# Patient Record
Sex: FEMALE
[De-identification: names, ages, dates, MRNs, and addresses within clinical notes are randomized per-mention and may not be internally consistent; named-entity substitution may affect disease eponyms.]

---

## 2020-12-19 ENCOUNTER — HOSPITAL ENCOUNTER (INPATIENT)
Dept: HOSPITAL 82 - ED | Age: 65
LOS: 1 days | Discharge: HOME | DRG: 293 | End: 2020-12-20
Attending: INTERNAL MEDICINE | Admitting: INTERNAL MEDICINE
Payer: MEDICARE

## 2020-12-19 VITALS — DIASTOLIC BLOOD PRESSURE: 74 MMHG | SYSTOLIC BLOOD PRESSURE: 103 MMHG

## 2020-12-19 VITALS — DIASTOLIC BLOOD PRESSURE: 78 MMHG | SYSTOLIC BLOOD PRESSURE: 138 MMHG

## 2020-12-19 VITALS — SYSTOLIC BLOOD PRESSURE: 122 MMHG | DIASTOLIC BLOOD PRESSURE: 59 MMHG

## 2020-12-19 VITALS — HEIGHT: 60 IN | BODY MASS INDEX: 31.22 KG/M2 | WEIGHT: 159 LBS

## 2020-12-19 VITALS — SYSTOLIC BLOOD PRESSURE: 146 MMHG | DIASTOLIC BLOOD PRESSURE: 73 MMHG

## 2020-12-19 VITALS — SYSTOLIC BLOOD PRESSURE: 143 MMHG | DIASTOLIC BLOOD PRESSURE: 63 MMHG

## 2020-12-19 DIAGNOSIS — E11.9: ICD-10-CM

## 2020-12-19 DIAGNOSIS — I11.0: Primary | ICD-10-CM

## 2020-12-19 DIAGNOSIS — I50.9: ICD-10-CM

## 2020-12-19 DIAGNOSIS — R09.02: ICD-10-CM

## 2020-12-19 DIAGNOSIS — Z79.899: ICD-10-CM

## 2020-12-19 DIAGNOSIS — E78.5: ICD-10-CM

## 2020-12-19 DIAGNOSIS — Z79.84: ICD-10-CM

## 2020-12-19 DIAGNOSIS — F17.210: ICD-10-CM

## 2020-12-19 DIAGNOSIS — E83.42: ICD-10-CM

## 2020-12-19 DIAGNOSIS — Z20.828: ICD-10-CM

## 2020-12-19 LAB
ALBUMIN SERPL-MCNC: 3.8 G/DL (ref 3.2–5)
ALP SERPL-CCNC: 108 U/L (ref 38–126)
ANION GAP SERPL CALCULATED.3IONS-SCNC: 10 MMOL/L
AST SERPL-CCNC: 49 U/L (ref 9–36)
BASOPHILS NFR BLD AUTO: 1 % (ref 0–3)
BUN SERPL-MCNC: 18 MG/DL (ref 8–23)
BUN/CREAT SERPL: 29
CHLORIDE SERPL-SCNC: 105 MMOL/L (ref 95–108)
CO2 SERPL-SCNC: 25 MMOL/L (ref 22–30)
CREAT SERPL-MCNC: 0.6 MG/DL (ref 0.5–1)
D DIMER PPP FEU-MCNC: 1.37 MG/L (ref 0.19–0.6)
EOSINOPHIL NFR BLD AUTO: 3 % (ref 0–8)
ERYTHROCYTE [DISTWIDTH] IN BLOOD BY AUTOMATED COUNT: 12.7 % (ref 11.5–15.5)
HCT VFR BLD AUTO: 36.1 % (ref 37–47)
HGB BLD-MCNC: 11.6 G/DL (ref 12–16)
IMM GRANULOCYTES NFR BLD: 0.4 % (ref 0–5)
INR PPP: 1 RATIO (ref 0.7–1.3)
LYMPHOCYTES NFR BLD: 37 % (ref 15–41)
MCH RBC QN AUTO: 30 PG  CALC (ref 26–32)
MCHC RBC AUTO-ENTMCNC: 32.1 G/DL CAL (ref 32–36)
MCV RBC AUTO: 93.3 FL  CALC (ref 80–100)
MONOCYTES NFR BLD AUTO: 4 % (ref 2–13)
MYOGLOBIN SERPL-MCNC: 29 NG/ML (ref 0–62)
NEUTROPHILS # BLD AUTO: 5.78 THOU/UL (ref 2–7.15)
NEUTROPHILS NFR BLD AUTO: 55 % (ref 42–76)
PLATELET # BLD AUTO: 191 THOU/UL (ref 130–400)
POTASSIUM SERPL-SCNC: 3.5 MMOL/L (ref 3.5–5.1)
PROT SERPL-MCNC: 6.5 G/DL (ref 6.3–8.2)
PROTHROMBIN TIME: 9.8 SECONDS (ref 9–12.5)
RBC # BLD AUTO: 3.87 MILL/UL (ref 4.2–5.6)
SODIUM SERPL-SCNC: 137 MMOL/L (ref 137–146)

## 2020-12-19 NOTE — NUR
REPORT RECEIVED FROM ANIL PADILLA RN. INTRODUCED SELF TO PT REPORTS COUGH AND SOB X
1 WEEK AND WORSENED TODAY. PT ON 1.5 L OF O2 VIA NC. RR 18-20. NSR WITH FREQ
[facility] ON TELE MONITOR. PT AOX3. PERFORMS ADLS PER SELF. PT REQUESTING
MEDICATION FOR COUGH, DR NAPIER NOTIFIED AND ORDER RECEIVED.

## 2020-12-19 NOTE — NUR
PT TRANSPORTED TO [facility]IA STRETCHER WITH MONITOR IN PLACE IN STABLE CONDITION.
PTS NURSE MADE AWARE OF PTS ARRIVAL TO FLOOR.

## 2020-12-19 NOTE — NUR
INFORMED MD ON CALL OF CRITICAL GLUCOSE, ORDERS ENTERED FOR HIGH DOSE SLIDING
SCALE INSULIN. CALL LIGHT IN REACH,CONTINUE TO MONITOR.

## 2020-12-19 NOTE — NUR
PATIETN RESTING QUIETLY, NO C/O PAIN OR DISCOMFORT, SLIGHTLY LABORED BREATHING,
AWAITING RESPIRATORY FOR ABG AND TREATMENT, FAMILY AT BEDSIDE.

## 2020-12-19 NOTE — NUR
PT RESTING IN BED, VOICES NO NEEDS OR COMPLAINTS AT THIS TIME, CALL LIGHT IN
REACH,CONTINUE TO MONITOR.

## 2020-12-19 NOTE — NUR
LASIX GIVEN, BEDSIDE COMMODE PLACED BESIDE THE BED, PATIENT EDUCATED TO USE
CALL LIGHT IF SHE NEEDS TO GET OUT OF BED, FAMILY AT BEDSIDE. AWAKE AND ALERT,
NO C/O PAIN OR DISCOMFORT, NO S/S OF DISTRESS NOTED, RESPIRATIONS EVEN AND
UNLABORED.

## 2020-12-19 NOTE — NUR
PT ARRIVED TO [facility] VIA WHEELCHAIR ACCOMPANIED BY ER NURSE. PT ALERT AND
ORIENTED X3, AMBULATED WITH STEADY GAIT TO BED, ORIENTED PT TO ROOM AND CALL
LIGHT, DISCUSSED POC, PT DENIES ANY NEEDS AT THIS TIME, DISCUSSED NITRO PASTE,
ADMISSION ASSESSMENT COMPLETED, TEDS APPLIED, CALL LIGHT IN REACH,CONTINUE TO
MONITOR.

## 2020-12-19 NOTE — NUR
PT IN BED WITH EYES OPEN AND ABLE TO MAKE NEEDS KNOWN. SKIN WARM TOTUCH.  MEDS
GIVEN AND TOLEERATED WELL. DENIES RESPIRATORY DISTRESS. OXYGEN IN PLACE AT
2LNC AND O2 SAT 99%.  CONTINENT OF B/B AND ABLE TO TRANSER SELF TO TOILET.
CALL LIGHT WITHIN REACH.  WILL CONTINUE TO OBSERVE.  ACCUCHECK 371 AND SSC
GIVEN AS ORDERED.  SNACK ALSO AT BEDSIDE

## 2020-12-19 NOTE — NUR
PT BRADYCARDIC AT 43 BPM WITH BP WITHIN NORMAL PARAMETERS 121/68. NITRO PATCH
THAT WAS IN LA CHEST REMOVED.

## 2020-12-20 VITALS — SYSTOLIC BLOOD PRESSURE: 116 MMHG | DIASTOLIC BLOOD PRESSURE: 71 MMHG

## 2020-12-20 VITALS — DIASTOLIC BLOOD PRESSURE: 41 MMHG | SYSTOLIC BLOOD PRESSURE: 128 MMHG

## 2020-12-20 VITALS — DIASTOLIC BLOOD PRESSURE: 62 MMHG | SYSTOLIC BLOOD PRESSURE: 104 MMHG

## 2020-12-20 VITALS — DIASTOLIC BLOOD PRESSURE: 61 MMHG | SYSTOLIC BLOOD PRESSURE: 115 MMHG

## 2020-12-20 LAB
ANION GAP SERPL CALCULATED.3IONS-SCNC: 11 MMOL/L
BUN SERPL-MCNC: 33 MG/DL (ref 8–23)
BUN/CREAT SERPL: 41
CHLORIDE SERPL-SCNC: 104 MMOL/L (ref 95–108)
CO2 SERPL-SCNC: 26 MMOL/L (ref 22–30)
CREAT SERPL-MCNC: 0.8 MG/DL (ref 0.5–1)
ERYTHROCYTE [DISTWIDTH] IN BLOOD BY AUTOMATED COUNT: 12.7 % (ref 11.5–15.5)
HCT VFR BLD AUTO: 35.9 % (ref 37–47)
HGB BLD-MCNC: 11.7 G/DL (ref 12–16)
MCH RBC QN AUTO: 30.1 PG  CALC (ref 26–32)
MCHC RBC AUTO-ENTMCNC: 32.6 G/DL CAL (ref 32–36)
MCV RBC AUTO: 92.3 FL  CALC (ref 80–100)
PLATELET # BLD AUTO: 197 THOU/UL (ref 130–400)
POTASSIUM SERPL-SCNC: 3.6 MMOL/L (ref 3.5–5.1)
RBC # BLD AUTO: 3.89 MILL/UL (ref 4.2–5.6)
SODIUM SERPL-SCNC: 137 MMOL/L (ref 137–146)

## 2020-12-20 NOTE — NUR
Discharge instructions given. Patient verbalizes understanding of same.
Discharged in stable condition via Wheelchair to Home with
family. All belongings sent with pt.
PT TRANSPORTED TO [facility] IN STABLE CONDITION VIA WHEELCHAIR ACCOMPANIED BY
WRITTER. ALL BELONGINGS LEFT WITH PATIENT. FAMILY TO TRANSPORT PT HOME.

## 2020-12-20 NOTE — NUR
PT RESTING IN SEMI FOWLERS POSITION;RESPIRATIONS EVEN AND UNLABORED ON O2 @ 2L
VIA NC;PT DENIES ANY CURRENT PAIN OR DISCOMFORTS;IV SITE PATENT;TELE
MONITORING IN PLACE;ACCUCHECK 316, PT COVERED WITH SLIDING SCALE INSULIN PER
ORDER;PT DENIES ANY ADDITIONAL NEEDS AND IS ENCOURAGED TO CALL FOR ASSISTANCE
IF NEEDED;CALL LIGHT IN REACH;WILL CONTINUE TO MONITOR

## 2020-12-20 NOTE — NUR
ALL DISCHARGE INSTRUCTIONS PROVIDED AT THIS TIME;PT INSTRUCTED TO F/U WITH PCP
WITHIN A WEEK, PT MAY BENEFIT FROM AN OUTPT ECHO, AND TO KEEP A BLOOD PRESSURE
LOG FOR HER PCP TO REVIEW;PT DENIES ANY QUESTIONS OR NEEDS;IV SITE REMOVED
WITH CATHETER INTACT AND TELE MONITORING D/C AT THIS TIME;WHEELCHAIR TO BE
PROVIDED FOR D/C HOME;FAMILY TO TRANSPORT PT HOME;WILL CONTINUE TO MONITOR

## 2020-12-20 NOTE — NUR
PT RESTING IN SEMI FOWLERS POSITION,A&O X3;VS OBTAINED AND ASSESSMENT
COMPLETED;PT DENIES ANY CURRENT PAIN OR DISCOMFORTS,PAIN SCALE AND REPORTING
EDUCATED;RESPIRATIONS EVEN AND UNLABORED ON O2 @ 2L VIA NC, PT IS NOT OXYGEN
DEPENDENT;ABDOMEN SOFT ON PALPATION AND ACTIVE IN ALL 4 QUADRANTS;STRONG PEDAL
PULSES;SKIN INTACT;#20G TO RAC FLUSHED AND PATENT,ABX STARTED PER ORDER;TELE
MONITORING IN PLACE;ACCUCHECK 117, NO COVERAGE NEEDED;PT REFUSES SCHEDULED FLU
VACCINE;PT REMAINS IN AIR/CONTACT ISOLATION DUE TO PENDING COVID19 RESULTS;PT
DENIES ANY ADDITIONAL NEEDS AT THIS TIME AND IS ENCOURAGED TO CALL FOR
ASSISTANCE IF NEEDED;FALL PRECAUTIONS IN PLACE WITH BED IN THE LOWEST POSITION
AND CALL LIGHT IN REACH;WILL CONTINUE TO MONITOR

## 2020-12-20 NOTE — NUR
Pt in bed with eyes closed. No s/s of distress noted. Continent of b/b and
ambulates to toilet with no complications noted.  Call light withn reach. Will
continue to observe

## 2022-09-08 ENCOUNTER — HOSPITAL ENCOUNTER (EMERGENCY)
Dept: HOSPITAL 82 - ED | Age: 67
Discharge: HOME | End: 2022-09-08
Payer: MEDICARE

## 2022-09-08 VITALS — DIASTOLIC BLOOD PRESSURE: 69 MMHG | SYSTOLIC BLOOD PRESSURE: 139 MMHG

## 2022-09-08 VITALS — DIASTOLIC BLOOD PRESSURE: 65 MMHG | SYSTOLIC BLOOD PRESSURE: 137 MMHG

## 2022-09-08 VITALS — SYSTOLIC BLOOD PRESSURE: 142 MMHG | DIASTOLIC BLOOD PRESSURE: 66 MMHG

## 2022-09-08 VITALS — SYSTOLIC BLOOD PRESSURE: 136 MMHG | DIASTOLIC BLOOD PRESSURE: 68 MMHG

## 2022-09-08 VITALS — SYSTOLIC BLOOD PRESSURE: 144 MMHG | DIASTOLIC BLOOD PRESSURE: 68 MMHG

## 2022-09-08 VITALS — DIASTOLIC BLOOD PRESSURE: 61 MMHG | SYSTOLIC BLOOD PRESSURE: 135 MMHG

## 2022-09-08 VITALS — SYSTOLIC BLOOD PRESSURE: 139 MMHG | DIASTOLIC BLOOD PRESSURE: 69 MMHG

## 2022-09-08 VITALS — SYSTOLIC BLOOD PRESSURE: 131 MMHG | DIASTOLIC BLOOD PRESSURE: 60 MMHG

## 2022-09-08 VITALS — DIASTOLIC BLOOD PRESSURE: 78 MMHG | SYSTOLIC BLOOD PRESSURE: 157 MMHG

## 2022-09-08 VITALS — SYSTOLIC BLOOD PRESSURE: 145 MMHG | DIASTOLIC BLOOD PRESSURE: 71 MMHG

## 2022-09-08 VITALS — SYSTOLIC BLOOD PRESSURE: 138 MMHG | DIASTOLIC BLOOD PRESSURE: 68 MMHG

## 2022-09-08 VITALS — WEIGHT: 158.29 LBS | BODY MASS INDEX: 31.08 KG/M2 | HEIGHT: 60 IN

## 2022-09-08 VITALS — SYSTOLIC BLOOD PRESSURE: 125 MMHG | DIASTOLIC BLOOD PRESSURE: 65 MMHG

## 2022-09-08 VITALS — SYSTOLIC BLOOD PRESSURE: 143 MMHG | DIASTOLIC BLOOD PRESSURE: 73 MMHG

## 2022-09-08 VITALS — SYSTOLIC BLOOD PRESSURE: 142 MMHG | DIASTOLIC BLOOD PRESSURE: 69 MMHG

## 2022-09-08 VITALS — SYSTOLIC BLOOD PRESSURE: 165 MMHG | DIASTOLIC BLOOD PRESSURE: 69 MMHG

## 2022-09-08 VITALS — DIASTOLIC BLOOD PRESSURE: 67 MMHG | SYSTOLIC BLOOD PRESSURE: 142 MMHG

## 2022-09-08 VITALS — DIASTOLIC BLOOD PRESSURE: 68 MMHG | SYSTOLIC BLOOD PRESSURE: 137 MMHG

## 2022-09-08 VITALS — DIASTOLIC BLOOD PRESSURE: 72 MMHG | SYSTOLIC BLOOD PRESSURE: 139 MMHG

## 2022-09-08 VITALS — DIASTOLIC BLOOD PRESSURE: 67 MMHG | SYSTOLIC BLOOD PRESSURE: 134 MMHG

## 2022-09-08 DIAGNOSIS — E11.9: ICD-10-CM

## 2022-09-08 DIAGNOSIS — Z79.4: ICD-10-CM

## 2022-09-08 DIAGNOSIS — Z79.84: ICD-10-CM

## 2022-09-08 DIAGNOSIS — R07.89: ICD-10-CM

## 2022-09-08 DIAGNOSIS — R05.9: ICD-10-CM

## 2022-09-08 DIAGNOSIS — F17.200: ICD-10-CM

## 2022-09-08 DIAGNOSIS — E78.5: ICD-10-CM

## 2022-09-08 DIAGNOSIS — U07.1: Primary | ICD-10-CM

## 2022-09-08 DIAGNOSIS — I10: ICD-10-CM

## 2022-09-08 LAB
ALBUMIN SERPL-MCNC: 4.7 G/DL (ref 3.2–5)
ALP SERPL-CCNC: 115 U/L (ref 38–126)
ANION GAP SERPL CALCULATED.3IONS-SCNC: 16 MMOL/L
AST SERPL-CCNC: 24 U/L (ref 9–36)
BASOPHILS NFR BLD AUTO: 1 % (ref 0–3)
BUN SERPL-MCNC: 20 MG/DL (ref 8–23)
BUN/CREAT SERPL: 24
CHLORIDE SERPL-SCNC: 101 MMOL/L (ref 95–108)
CO2 SERPL-SCNC: 24 MMOL/L (ref 22–30)
CREAT SERPL-MCNC: 0.8 MG/DL (ref 0.5–1)
EOSINOPHIL NFR BLD AUTO: 0 % (ref 0–8)
ERYTHROCYTE [DISTWIDTH] IN BLOOD BY AUTOMATED COUNT: 13 % (ref 11.5–15.5)
HCT VFR BLD AUTO: 39.3 % (ref 37–47)
HGB BLD-MCNC: 12.7 G/DL (ref 12–16)
IMM GRANULOCYTES NFR BLD: 0.4 % (ref 0–5)
LYMPHOCYTES NFR BLD: 16 % (ref 15–41)
MCH RBC QN AUTO: 30.2 PG  CALC (ref 26–32)
MCHC RBC AUTO-ENTMCNC: 32.3 G/DL CAL (ref 32–36)
MCV RBC AUTO: 93.6 FL  CALC (ref 80–100)
MONOCYTES NFR BLD AUTO: 11 % (ref 2–13)
NEUTROPHILS # BLD AUTO: 3.52 THOU/UL (ref 2–7.15)
NEUTROPHILS NFR BLD AUTO: 72 % (ref 42–76)
PLATELET # BLD AUTO: 171 THOU/UL (ref 130–400)
POTASSIUM SERPL-SCNC: 3.6 MMOL/L (ref 3.5–5.1)
PROT SERPL-MCNC: 8.5 G/DL (ref 6.3–8.2)
RBC # BLD AUTO: 4.2 MILL/UL (ref 4.2–5.6)
SODIUM SERPL-SCNC: 137 MMOL/L (ref 137–146)

## 2022-12-30 ENCOUNTER — HOSPITAL ENCOUNTER (EMERGENCY)
Dept: HOSPITAL 82 - ED | Age: 67
Discharge: HOME | End: 2022-12-30
Payer: MEDICARE

## 2022-12-30 VITALS — DIASTOLIC BLOOD PRESSURE: 61 MMHG | SYSTOLIC BLOOD PRESSURE: 156 MMHG

## 2022-12-30 VITALS — DIASTOLIC BLOOD PRESSURE: 69 MMHG | SYSTOLIC BLOOD PRESSURE: 146 MMHG

## 2022-12-30 VITALS — SYSTOLIC BLOOD PRESSURE: 146 MMHG | DIASTOLIC BLOOD PRESSURE: 69 MMHG

## 2022-12-30 VITALS — HEIGHT: 60 IN | WEIGHT: 143.5 LBS | BODY MASS INDEX: 28.17 KG/M2

## 2022-12-30 DIAGNOSIS — E78.5: ICD-10-CM

## 2022-12-30 DIAGNOSIS — Z79.84: ICD-10-CM

## 2022-12-30 DIAGNOSIS — Z79.4: ICD-10-CM

## 2022-12-30 DIAGNOSIS — I10: ICD-10-CM

## 2022-12-30 DIAGNOSIS — M54.2: Primary | ICD-10-CM

## 2022-12-30 DIAGNOSIS — E11.9: ICD-10-CM
